# Patient Record
Sex: MALE | Race: OTHER
[De-identification: names, ages, dates, MRNs, and addresses within clinical notes are randomized per-mention and may not be internally consistent; named-entity substitution may affect disease eponyms.]

---

## 2018-06-03 ENCOUNTER — HOSPITAL ENCOUNTER (EMERGENCY)
Dept: HOSPITAL 12 - ER | Age: 38
Discharge: HOME | End: 2018-06-03
Payer: COMMERCIAL

## 2018-06-03 VITALS — HEIGHT: 70 IN | WEIGHT: 183 LBS | BODY MASS INDEX: 26.2 KG/M2

## 2018-06-03 VITALS — SYSTOLIC BLOOD PRESSURE: 129 MMHG | DIASTOLIC BLOOD PRESSURE: 93 MMHG

## 2018-06-03 DIAGNOSIS — K21.9: ICD-10-CM

## 2018-06-03 DIAGNOSIS — Z88.8: ICD-10-CM

## 2018-06-03 DIAGNOSIS — Z79.899: ICD-10-CM

## 2018-06-03 DIAGNOSIS — R20.0: Primary | ICD-10-CM

## 2018-06-03 DIAGNOSIS — R51: ICD-10-CM

## 2018-06-03 DIAGNOSIS — Z88.1: ICD-10-CM

## 2018-06-03 DIAGNOSIS — I10: ICD-10-CM

## 2018-06-03 LAB
ALP SERPL-CCNC: 45 U/L (ref 50–136)
ALT SERPL W/O P-5'-P-CCNC: 20 U/L (ref 16–63)
APPEARANCE UR: CLEAR
AST SERPL-CCNC: 14 U/L (ref 15–37)
BASOPHILS # BLD AUTO: 0.1 K/UL (ref 0–8)
BASOPHILS NFR BLD AUTO: 0.8 % (ref 0–2)
BILIRUB SERPL-MCNC: 0.5 MG/DL (ref 0.2–1)
BILIRUB UR QL STRIP: NEGATIVE
BUN SERPL-MCNC: 9 MG/DL (ref 7–18)
CHLORIDE SERPL-SCNC: 103 MMOL/L (ref 98–107)
CO2 SERPL-SCNC: 24 MMOL/L (ref 21–32)
COLOR UR: YELLOW
CREAT SERPL-MCNC: 0.9 MG/DL (ref 0.6–1.3)
EOSINOPHIL # BLD AUTO: 0.4 K/UL (ref 0–0.7)
EOSINOPHIL NFR BLD AUTO: 3.3 % (ref 0–7)
GLUCOSE SERPL-MCNC: 87 MG/DL (ref 74–106)
GLUCOSE UR STRIP-MCNC: NEGATIVE MG/DL
HCT VFR BLD AUTO: 44.3 % (ref 36.7–47.1)
HGB BLD-MCNC: 15.2 G/DL (ref 12.5–16.3)
HGB UR QL STRIP: (no result)
KETONES UR STRIP-MCNC: NEGATIVE MG/DL
LEUKOCYTE ESTERASE UR QL STRIP: NEGATIVE
LIPASE SERPL-CCNC: 146 U/L (ref 73–393)
LYMPHOCYTES # BLD AUTO: 2.6 K/UL (ref 20–40)
LYMPHOCYTES NFR BLD AUTO: 20.3 % (ref 20.5–51.5)
MCH RBC QN AUTO: 30.4 UUG (ref 23.8–33.4)
MCHC RBC AUTO-ENTMCNC: 34 G/DL (ref 32.5–36.3)
MCV RBC AUTO: 88.8 FL (ref 73–96.2)
MONOCYTES # BLD AUTO: 0.8 K/UL (ref 2–10)
MONOCYTES NFR BLD AUTO: 6 % (ref 0–11)
NEUTROPHILS # BLD AUTO: 8.8 K/UL (ref 1.8–8.9)
NEUTROPHILS NFR BLD AUTO: 69.6 % (ref 38.5–71.5)
NITRITE UR QL STRIP: NEGATIVE
PH UR STRIP: 6 [PH] (ref 5–8)
PLATELET # BLD AUTO: 314 K/UL (ref 152–348)
POTASSIUM SERPL-SCNC: 3.8 MMOL/L (ref 3.5–5.1)
PROT UR QL STRIP: NEGATIVE
RBC # BLD AUTO: 4.99 MIL/UL (ref 4.06–5.63)
RBC #/AREA URNS HPF: (no result) /HPF (ref 0–3)
SP GR UR STRIP: 1.01 (ref 1–1.03)
UROBILINOGEN UR STRIP-MCNC: 0.2 E.U./DL
WBC # BLD AUTO: 12.7 K/UL (ref 3.6–10.2)
WBC #/AREA URNS HPF: (no result) /HPF (ref 0–3)
WS STN SPEC: 7.4 G/DL (ref 6.4–8.2)

## 2018-06-03 PROCEDURE — A4663 DIALYSIS BLOOD PRESSURE CUFF: HCPCS

## 2018-06-03 NOTE — NUR
Patient discharged to home in stable conditon.  Written and verbal after care 
instructions given. 

Patient verbalizes understanding of instructions. Pt ambulated from ER w/ 
steady gait. Denies dizziness, HA, SOB, CP. VSS. No distress noted. Pt took all 
personal belongings.